# Patient Record
Sex: FEMALE | Race: OTHER | HISPANIC OR LATINO | Employment: FULL TIME | URBAN - METROPOLITAN AREA
[De-identification: names, ages, dates, MRNs, and addresses within clinical notes are randomized per-mention and may not be internally consistent; named-entity substitution may affect disease eponyms.]

---

## 2020-06-06 ENCOUNTER — APPOINTMENT (EMERGENCY)
Dept: RADIOLOGY | Facility: HOSPITAL | Age: 20
End: 2020-06-06
Payer: COMMERCIAL

## 2020-06-06 ENCOUNTER — HOSPITAL ENCOUNTER (EMERGENCY)
Facility: HOSPITAL | Age: 20
Discharge: HOME | End: 2020-06-07
Attending: EMERGENCY MEDICINE
Payer: COMMERCIAL

## 2020-06-06 VITALS
WEIGHT: 154.13 LBS | TEMPERATURE: 97.8 F | HEART RATE: 100 BPM | OXYGEN SATURATION: 98 % | RESPIRATION RATE: 22 BRPM | DIASTOLIC BLOOD PRESSURE: 90 MMHG | SYSTOLIC BLOOD PRESSURE: 151 MMHG

## 2020-06-06 DIAGNOSIS — S62.639A CLOSED FRACTURE OF TUFT OF DISTAL PHALANX OF FINGER: Primary | ICD-10-CM

## 2020-06-06 DIAGNOSIS — S50.11XA CONTUSION OF ELBOW AND FOREARM, RIGHT, INITIAL ENCOUNTER: ICD-10-CM

## 2020-06-06 DIAGNOSIS — S61.316A LACERATION OF RIGHT LITTLE FINGER WITHOUT FOREIGN BODY WITH DAMAGE TO NAIL, INITIAL ENCOUNTER: ICD-10-CM

## 2020-06-06 LAB
EXT PREG TEST URINE: NEGATIVE
EXT. CONTROL ED NAV: NORMAL

## 2020-06-06 PROCEDURE — 12001 RPR S/N/AX/GEN/TRNK 2.5CM/<: CPT | Performed by: EMERGENCY MEDICINE

## 2020-06-06 PROCEDURE — 73110 X-RAY EXAM OF WRIST: CPT

## 2020-06-06 PROCEDURE — 73090 X-RAY EXAM OF FOREARM: CPT

## 2020-06-06 PROCEDURE — 81025 URINE PREGNANCY TEST: CPT | Performed by: EMERGENCY MEDICINE

## 2020-06-06 PROCEDURE — 99283 EMERGENCY DEPT VISIT LOW MDM: CPT

## 2020-06-06 PROCEDURE — 73130 X-RAY EXAM OF HAND: CPT

## 2020-06-06 PROCEDURE — 99285 EMERGENCY DEPT VISIT HI MDM: CPT | Performed by: EMERGENCY MEDICINE

## 2020-06-06 RX ORDER — LORAZEPAM 0.5 MG/1
1 TABLET ORAL ONCE
Status: COMPLETED | OUTPATIENT
Start: 2020-06-06 | End: 2020-06-06

## 2020-06-06 RX ORDER — ACETAMINOPHEN AND CODEINE PHOSPHATE 300; 30 MG/1; MG/1
1-2 TABLET ORAL EVERY 6 HOURS PRN
Qty: 5 TABLET | Refills: 0 | Status: SHIPPED | OUTPATIENT
Start: 2020-06-06 | End: 2020-06-16

## 2020-06-06 RX ORDER — IBUPROFEN 600 MG/1
600 TABLET ORAL EVERY 6 HOURS PRN
Qty: 20 TABLET | Refills: 0 | Status: SHIPPED | OUTPATIENT
Start: 2020-06-06 | End: 2020-09-15

## 2020-06-06 RX ORDER — LIDOCAINE HYDROCHLORIDE 20 MG/ML
5 INJECTION, SOLUTION EPIDURAL; INFILTRATION; INTRACAUDAL; PERINEURAL ONCE
Status: COMPLETED | OUTPATIENT
Start: 2020-06-06 | End: 2020-06-06

## 2020-06-06 RX ORDER — NAPROXEN 500 MG/1
500 TABLET ORAL ONCE
Status: COMPLETED | OUTPATIENT
Start: 2020-06-06 | End: 2020-06-06

## 2020-06-06 RX ADMIN — LORAZEPAM 1 MG: 0.5 TABLET ORAL at 23:06

## 2020-06-06 RX ADMIN — NAPROXEN 500 MG: 500 TABLET ORAL at 23:06

## 2020-06-06 RX ADMIN — LIDOCAINE HYDROCHLORIDE 5 ML: 20 INJECTION, SOLUTION EPIDURAL; INFILTRATION; INTRACAUDAL; PERINEURAL at 23:46

## 2020-06-07 PROCEDURE — 90715 TDAP VACCINE 7 YRS/> IM: CPT | Performed by: EMERGENCY MEDICINE

## 2020-06-07 PROCEDURE — 90471 IMMUNIZATION ADMIN: CPT

## 2020-06-07 RX ORDER — GINSENG 100 MG
1 CAPSULE ORAL ONCE
Status: COMPLETED | OUTPATIENT
Start: 2020-06-07 | End: 2020-06-07

## 2020-06-07 RX ADMIN — TETANUS TOXOID, REDUCED DIPHTHERIA TOXOID AND ACELLULAR PERTUSSIS VACCINE, ADSORBED 0.5 ML: 5; 2.5; 8; 8; 2.5 SUSPENSION INTRAMUSCULAR at 00:49

## 2020-06-07 RX ADMIN — BACITRACIN ZINC 1 SMALL APPLICATION: 500 OINTMENT TOPICAL at 00:49

## 2020-07-14 ENCOUNTER — HOSPITAL ENCOUNTER (EMERGENCY)
Facility: HOSPITAL | Age: 20
Discharge: HOME | End: 2020-07-14
Attending: EMERGENCY MEDICINE | Admitting: EMERGENCY MEDICINE
Payer: COMMERCIAL

## 2020-07-14 VITALS
WEIGHT: 150.8 LBS | HEART RATE: 71 BPM | OXYGEN SATURATION: 98 % | RESPIRATION RATE: 16 BRPM | SYSTOLIC BLOOD PRESSURE: 116 MMHG | DIASTOLIC BLOOD PRESSURE: 77 MMHG | TEMPERATURE: 97.2 F

## 2020-07-14 DIAGNOSIS — H65.192 ACUTE MIDDLE EAR EFFUSION, LEFT: Primary | ICD-10-CM

## 2020-07-14 PROCEDURE — 99283 EMERGENCY DEPT VISIT LOW MDM: CPT | Performed by: PHYSICIAN ASSISTANT

## 2020-07-14 PROCEDURE — 99282 EMERGENCY DEPT VISIT SF MDM: CPT

## 2020-07-14 RX ORDER — ACETAMINOPHEN 325 MG/1
650 TABLET ORAL EVERY 6 HOURS PRN
Qty: 30 TABLET | Refills: 0 | Status: SHIPPED | OUTPATIENT
Start: 2020-07-14 | End: 2020-09-15

## 2020-07-14 RX ORDER — FEXOFENADINE HYDROCHLORIDE 60 MG/1
60 TABLET, FILM COATED ORAL 2 TIMES DAILY
Qty: 20 TABLET | Refills: 0 | Status: SHIPPED | OUTPATIENT
Start: 2020-07-14 | End: 2020-09-15

## 2020-07-14 NOTE — ED PROVIDER NOTES
History  Chief Complaint   Patient presents with    Earache     left ear pain and headache x1 week, no meds taken for symptoms     60-year-old female presenting for evaluation left-sided ear pain for approximately 1 week  She reports pain is nonradiating and giving her headache  She did not take anything for pain prior to arrival   She denies any sore throat  States she has had nasal congestion  No fevers chills sweats nausea vomiting or diarrhea  No known covered positive exposure  No cough  Prior to Admission Medications   Prescriptions Last Dose Informant Patient Reported? Taking?   ibuprofen (MOTRIN) 600 mg tablet   No No   Sig: Take 1 tablet (600 mg total) by mouth every 6 (six) hours as needed for mild pain      Facility-Administered Medications: None       Past Medical History:   Diagnosis Date    Asthma        History reviewed  No pertinent surgical history  History reviewed  No pertinent family history  I have reviewed and agree with the history as documented  E-Cigarette/Vaping     E-Cigarette/Vaping Substances     Social History     Tobacco Use    Smoking status: Never Smoker    Smokeless tobacco: Never Used   Substance Use Topics    Alcohol use: Not Currently    Drug use: Never       Review of Systems   All other systems reviewed and are negative  Physical Exam  Physical Exam   Constitutional: She is oriented to person, place, and time  She appears well-developed and well-nourished  No distress  HENT:   Head: Normocephalic and atraumatic  Right Ear: External ear normal    Left Ear: External ear normal    Ears:    No L mastoid tenderness, no tenderness of the L pinna or tragus   Eyes: Conjunctivae are normal    EOM grossly intact   Neck: Normal range of motion  Neck supple  No JVD present  Cardiovascular: Normal rate  Pulmonary/Chest: Effort normal    Abdominal: Soft     Musculoskeletal:   FROM, steady gait, cap refill brisk, strength and sensation grossly intact throughout   Neurological: She is alert and oriented to person, place, and time  Skin: Skin is warm and dry  Capillary refill takes less than 2 seconds  Psychiatric: She has a normal mood and affect  Her behavior is normal    Nursing note and vitals reviewed  Vital Signs  ED Triage Vitals [07/14/20 1618]   Temperature Pulse Respirations Blood Pressure SpO2   (!) 97 2 °F (36 2 °C) 71 16 116/77 98 %      Temp Source Heart Rate Source Patient Position - Orthostatic VS BP Location FiO2 (%)   Tympanic Monitor Sitting Left arm --      Pain Score       --           Vitals:    07/14/20 1618   BP: 116/77   Pulse: 71   Patient Position - Orthostatic VS: Sitting         Visual Acuity      ED Medications  Medications - No data to display    Diagnostic Studies  Results Reviewed     None                 No orders to display              Procedures  Procedures         ED Course           CRAFFT      Most Recent Value   During the past 12 months, did you:   1  Drink any alcohol (more than a few sips)? No Filed at: 07/14/2020 1619   2  Smoke any marijuana or hashish  No Filed at: 07/14/2020 1619   3  Use anything else to get high? ("anything else" includes illegal drugs, over the counter and prescription drugs, and things that you sniff or 'swift')? No Filed at: 07/14/2020 1619                                        MDM  Number of Diagnoses or Management Options  Diagnosis management comments: 28-year-old female presenting for evaluation of left ear pain, physical examination patient has mid ear effusion, no physical exam findings of acute otitis media or otitis externa, will provide patient with prescription for antihistamine, she is afebrile, vitals normal, f/u with pcp as an outpatient    strict return to ED precautions discussed  Pt verbalizes understanding and agrees with plan  Pt is stable for discharge    Portions of the record may have been created with voice recognition software   Occasional wrong word or "sound a like" substitutions may have occurred due to the inherent limitations of voice recognition software  Read the chart carefully and recognize, using context, where substitutions have occurred  Disposition  Final diagnoses:   Acute middle ear effusion, left     Time reflects when diagnosis was documented in both MDM as applicable and the Disposition within this note     Time User Action Codes Description Comment    7/14/2020  4:29 PM Jacob Cifuentes Radames [H65 192] Acute middle ear effusion, left       ED Disposition     ED Disposition Condition Date/Time Comment    Discharge Stable Tue Jul 14, 2020  4:29 PM Orrtanna Friendly discharge to home/self care  Follow-up Information     Follow up With Specialties Details Why Contact Info Additional [address] Call in 1 day  [address], [address] [facility] [address]  [address], [address], [address] [place], [place], [address]          Patient's Medications   Discharge Prescriptions    ACETAMINOPHEN (TYLENOL) 325 MG TABLET    Take 2 tablets (650 mg total) by mouth every 6 (six) hours as needed for mild pain Take 2 tablets by mouth every 6 hours as needed for pain       Start Date: 7/14/2020 End Date: --       Order Dose: 650 mg       Quantity: 30 tablet    Refills: 0    FEXOFENADINE (ALLEGRA) 60 MG TABLET    Take 1 tablet (60 mg total) by mouth 2 (two) times a day       Start Date: 7/14/2020 End Date: --       Order Dose: 60 mg       Quantity: 20 tablet    Refills: 0     No discharge procedures on file      PDMP Review     None          ED Provider  Electronically Signed by           Christiano Farr PA-C  07/14/20 8620

## 2020-09-10 ENCOUNTER — HOSPITAL ENCOUNTER (EMERGENCY)
Facility: HOSPITAL | Age: 20
Discharge: HOME | End: 2020-09-11
Attending: EMERGENCY MEDICINE | Admitting: EMERGENCY MEDICINE
Payer: COMMERCIAL

## 2020-09-10 DIAGNOSIS — N93.9 ABNORMAL UTERINE BLEEDING: Primary | ICD-10-CM

## 2020-09-10 DIAGNOSIS — Z71.1 CONCERN ABOUT STD IN FEMALE WITHOUT DIAGNOSIS: ICD-10-CM

## 2020-09-10 LAB
BILIRUB UR QL STRIP: NEGATIVE
CLARITY UR: CLEAR
COLOR UR: YELLOW
EXT PREG TEST URINE: NEGATIVE
EXT. CONTROL ED NAV: NORMAL
GLUCOSE UR STRIP-MCNC: NEGATIVE MG/DL
HGB UR QL STRIP.AUTO: ABNORMAL
KETONES UR STRIP-MCNC: ABNORMAL MG/DL
LEUKOCYTE ESTERASE UR QL STRIP: ABNORMAL
NITRITE UR QL STRIP: NEGATIVE
PH UR STRIP.AUTO: 6 [PH] (ref 4.5–8)
PROT UR STRIP-MCNC: NEGATIVE MG/DL
SP GR UR STRIP.AUTO: >=1.03 (ref 1–1.03)
UROBILINOGEN UR QL STRIP.AUTO: 0.2 E.U./DL

## 2020-09-10 PROCEDURE — 81025 URINE PREGNANCY TEST: CPT | Performed by: NURSE PRACTITIONER

## 2020-09-10 PROCEDURE — 81001 URINALYSIS AUTO W/SCOPE: CPT

## 2020-09-10 PROCEDURE — 99284 EMERGENCY DEPT VISIT MOD MDM: CPT

## 2020-09-10 NOTE — Clinical Note
Zoë Berger was seen and treated in our emergency department on 9/10/2020  Diagnosis:     Alcides Russell  may return to work on return date  She may return on this date: 09/11/2020         If you have any questions or concerns, please don't hesitate to call        Bry Miller RN    ______________________________           _______________          _______________  Hospital Representative                              Date                                Time

## 2020-09-11 ENCOUNTER — TELEPHONE (OUTPATIENT)
Dept: OBGYN CLINIC | Facility: CLINIC | Age: 20
End: 2020-09-11

## 2020-09-11 VITALS
SYSTOLIC BLOOD PRESSURE: 110 MMHG | RESPIRATION RATE: 16 BRPM | DIASTOLIC BLOOD PRESSURE: 70 MMHG | TEMPERATURE: 98.1 F | WEIGHT: 150.13 LBS | HEART RATE: 74 BPM | OXYGEN SATURATION: 100 %

## 2020-09-11 LAB
B-HCG SERPL-ACNC: <2 MIU/ML
BACTERIA UR QL AUTO: ABNORMAL /HPF
NON-SQ EPI CELLS URNS QL MICRO: ABNORMAL /HPF
RBC #/AREA URNS AUTO: ABNORMAL /HPF
WBC #/AREA URNS AUTO: ABNORMAL /HPF

## 2020-09-11 PROCEDURE — 99282 EMERGENCY DEPT VISIT SF MDM: CPT | Performed by: NURSE PRACTITIONER

## 2020-09-11 PROCEDURE — 87591 N.GONORRHOEAE DNA AMP PROB: CPT | Performed by: NURSE PRACTITIONER

## 2020-09-11 PROCEDURE — 84702 CHORIONIC GONADOTROPIN TEST: CPT | Performed by: NURSE PRACTITIONER

## 2020-09-11 PROCEDURE — 87491 CHLMYD TRACH DNA AMP PROBE: CPT | Performed by: NURSE PRACTITIONER

## 2020-09-11 PROCEDURE — 96372 THER/PROPH/DIAG INJ SC/IM: CPT

## 2020-09-11 PROCEDURE — 36415 COLL VENOUS BLD VENIPUNCTURE: CPT | Performed by: NURSE PRACTITIONER

## 2020-09-11 RX ORDER — ACETAMINOPHEN 325 MG/1
975 TABLET ORAL ONCE
Status: COMPLETED | OUTPATIENT
Start: 2020-09-11 | End: 2020-09-11

## 2020-09-11 RX ORDER — AZITHROMYCIN 250 MG/1
1000 TABLET, FILM COATED ORAL ONCE
Status: COMPLETED | OUTPATIENT
Start: 2020-09-11 | End: 2020-09-11

## 2020-09-11 RX ADMIN — ACETAMINOPHEN 975 MG: 325 TABLET ORAL at 00:19

## 2020-09-11 RX ADMIN — AZITHROMYCIN MONOHYDRATE 1000 MG: 250 TABLET ORAL at 00:19

## 2020-09-11 RX ADMIN — LIDOCAINE HYDROCHLORIDE 250 MG: 10 INJECTION, SOLUTION EPIDURAL; INFILTRATION; INTRACAUDAL; PERINEURAL at 00:19

## 2020-09-11 NOTE — DISCHARGE INSTRUCTIONS
You are to call the above numbers for a family doctor and women's OB/GYN for follow up  You may also call the [facility] for other concerns    NO sex until symptoms are gone and recheck  You have a urine culture pending and will be notified if it is abnormal - your partners will need to be notified and treated  You are to see gyn for IUD removal

## 2020-09-11 NOTE — ED PROVIDER NOTES
History  Chief Complaint   Patient presents with    Vaginal Bleeding     Pt reports "I think I am having a miscarriage" Pt reports did not take test but has a bloody tissue in her bag  low abd pain     This is a 23year old female who states moved here from [place] 3 months ago and her LMP was 2020, she missed July and august  She states that she has regular menses  She states tonight she passed some blood and she is concerned she had a miscarriage  She denies taking a pregnancy test at home  She states she has had 2 sexual partners since arriving in [address]  She denies knowledge of STD's from partners  Pt states she has b/l lower pelvic cramping  Denies fevers, n/v/d  She states she has very little bleeding at this time  Pt states she has an IUD that  in April       History provided by:  Medical records and patient   used: Yes    Vaginal Bleeding   Quality:  Bright red and passed tissue  Onset quality:  Sudden  Progression:  Improving  Menstrual history:  Regular  Number of pads used:  Unable to state   Possible pregnancy: yes    Associated symptoms: abdominal pain    Risk factors: multiple partners and unprotected sex        Prior to Admission Medications   Prescriptions Last Dose Informant Patient Reported? Taking?   acetaminophen (TYLENOL) 325 mg tablet   No Yes   Sig: Take 2 tablets (650 mg total) by mouth every 6 (six) hours as needed for mild pain Take 2 tablets by mouth every 6 hours as needed for pain   fexofenadine (ALLEGRA) 60 MG tablet   No Yes   Sig: Take 1 tablet (60 mg total) by mouth 2 (two) times a day   ibuprofen (MOTRIN) 600 mg tablet Not Taking at Unknown time  No No   Sig: Take 1 tablet (600 mg total) by mouth every 6 (six) hours as needed for mild pain   Patient not taking: Reported on 2020      Facility-Administered Medications: None       Past Medical History:   Diagnosis Date    Asthma     Gastritis        History reviewed   No pertinent surgical history  History reviewed  No pertinent family history  I have reviewed and agree with the history as documented  E-Cigarette/Vaping    E-Cigarette Use Never User      E-Cigarette/Vaping Substances     Social History     Tobacco Use    Smoking status: Never Smoker    Smokeless tobacco: Never Used   Substance Use Topics    Alcohol use: Not Currently    Drug use: Never       Review of Systems   Gastrointestinal: Positive for abdominal pain  Genitourinary: Positive for vaginal bleeding  All other systems reviewed and are negative  Physical Exam  Physical Exam  Vitals signs and nursing note reviewed  Constitutional:       General: She is not in acute distress  Appearance: Normal appearance  She is normal weight  She is not ill-appearing, toxic-appearing or diaphoretic  Comments: Pt observed on cell  Phone and speaking with friend at bedside  Pt friend at bedside speaking on cell phone - she was asked multiple times to refrain from speaking on phone will provider is attempting to use crycrom interpretor via I pad and it is difficult to hear  Friend becomes visibly agitated at request      HENT:      Head: Normocephalic and atraumatic  Eyes:      Extraocular Movements: Extraocular movements intact  Pupils: Pupils are equal, round, and reactive to light  Neck:      Musculoskeletal: Normal range of motion  Cardiovascular:      Rate and Rhythm: Normal rate and regular rhythm  Pulmonary:      Effort: Pulmonary effort is normal       Breath sounds: Normal breath sounds  Abdominal:      General: Bowel sounds are normal  There is no distension  Tenderness: There is no abdominal tenderness  Genitourinary:         Comments: Pt has a piece of toilet tissue in a ziplock baggie with 2 small light to dark red spots on it    approx 10 mm in diameter each     Pelvic Exam:  No pain with speculum insertion  Light pink blood noted at entrance of vagina  Cervix is cherry pink, + white/grey discharge  No blood products or tissue noted in vagina  Musculoskeletal: Normal range of motion  Skin:     General: Skin is warm and dry  Neurological:      General: No focal deficit present  Mental Status: She is alert and oriented to person, place, and time  Psychiatric:         Mood and Affect: Mood normal          Behavior: Behavior normal          Thought Content:  Thought content normal          Judgment: Judgment normal          Vital Signs  ED Triage Vitals   Temperature Pulse Respirations Blood Pressure SpO2   09/10/20 2247 09/10/20 2247 09/10/20 2247 09/10/20 2247 09/10/20 2247   98 1 °F (36 7 °C) 94 16 113/66 97 %      Temp src Heart Rate Source Patient Position - Orthostatic VS BP Location FiO2 (%)   -- 09/11/20 0043 09/11/20 0043 09/11/20 0043 --    Monitor Lying Left arm       Pain Score       09/10/20 2247       6           Vitals:    09/10/20 2247 09/11/20 0043   BP: 113/66 110/70   Pulse: 94 74   Patient Position - Orthostatic VS:  Lying         Visual Acuity      ED Medications  Medications   azithromycin (ZITHROMAX) tablet 1,000 mg (1,000 mg Oral Given 9/11/20 0019)   cefTRIAXone (ROCEPHIN) 250 mg in lidocaine (PF) (XYLOCAINE-MPF) 1 % IM only syringe (250 mg Intramuscular Given 9/11/20 0019)   acetaminophen (TYLENOL) tablet 975 mg (975 mg Oral Given 9/11/20 0019)       Diagnostic Studies  Results Reviewed     Procedure Component Value Units Date/Time    hCG, quantitative [[id number]]  (Normal) Collected:  09/11/20 0021    Lab Status:  Final result Specimen:  Blood from Arm, Right Updated:  09/11/20 0050     HCG, Quant <2 mIU/mL     Narrative:        Expected Ranges:     Approximate               Approximate HCG  Gestation age          Concentration ( mIU/mL)  _____________          ______________________   Zula Him                      HCG values  0 2-1                       5-50  1-2                           2-3                         100-5000  3-4 500-99523  4-5                         1000-02306  5-6                         [id number]  6-8                         [id number]  8-12                        [id number]      Urine Microscopic [[id number]]  (Abnormal) Collected:  09/10/20 2353    Lab Status:  Final result Specimen:  Urine, Clean Catch Updated:  09/11/20 0022     RBC, UA 2-4 /hpf      WBC, UA 2-4 /hpf      Epithelial Cells Occasional /hpf      Bacteria, UA None Seen /hpf     Chlamydia/GC amplified DNA by PCR [[id number]] Collected:  09/11/20 0011    Lab Status: In process Specimen:  Urine, Other Updated:  09/11/20 0016    POCT urinalysis dipstick [[id number]]  (Abnormal) Resulted:  09/10/20 2357    Lab Status:  Final result Specimen:  Urine Updated:  09/10/20 2357    POCT pregnancy, urine [[id number]]  (Normal) Resulted:  09/10/20 2357    Lab Status:  Final result Updated:  09/10/20 2357     EXT PREG TEST UR (Ref: Negative) Negative     Control Valid    Urine Macroscopic, POC [[id number]]  (Abnormal) Collected:  09/10/20 2353    Lab Status:  Final result Specimen:  Urine Updated:  09/10/20 2355     Color, UA Yellow     Clarity, UA Clear     pH, UA 6 0     Leukocytes, UA Trace     Nitrite, UA Negative     Protein, UA Negative mg/dl      Glucose, UA Negative mg/dl      Ketones, UA Trace mg/dl      Urobilinogen, UA 0 2 E U /dl      Bilirubin, UA Negative     Blood, UA Trace     Specific Gravity, UA >=1 030    Narrative:       CLINITEK RESULT                 No orders to display              Procedures  Procedures         ED Course  ED Course as of Sep 11 0122   Fri Sep 11, 2020   0009 PREGNANCY TEST URINE: Negative   0036 Urine not positive for UTI  Education provided in Occitan to pt regarding STD concern, culture and need of treatment for partners  Pt verbalizes understanding of all dc instructions and follow up       0059 Beta quant negative  Pt verbalizes understanding of all dc instructions and follow up     Vaginal bleeding appears to be consistent with STD concern, as her pelvic exam was consistent with this as well  CARRIE      Most Recent Value   SBIRT (13-23 yo)   In order to provide better care to our patients, we are screening all of our patients for alcohol and drug use  Would it be okay to ask you these screening questions? Yes Filed at: 2020 0004   CARRIE Initial Screen: During the past 12 months, did you:   1  Drink any alcohol (more than a few sips)? No Filed at: 2020 0004   2  Smoke any marijuana or hashish  No Filed at: 2020 0004   3  Use anything else to get high? ("anything else" includes illegal drugs, over the counter and prescription drugs, and things that you sniff or 'swift')? No Filed at: 2020 0004                                    [facility]  Number of Diagnoses or Management Options  Abnormal uterine bleeding:   Concern about STD in female without diagnosis:   Diagnosis management comments: Vaginal bleeding  Concern for miscarriage    DDX:  Spontaneous   Miscarriage in process  STD  UTI    Plan  uahcg  ua  Will do beta quant as ua hcg negative   gc chlamydia  Azithromycin  Rocephin IM  Vaginal exam         Amount and/or Complexity of Data Reviewed  Clinical lab tests: ordered and reviewed  Review and summarize past medical records: yes        Disposition  Final diagnoses:   Abnormal uterine bleeding   Concern about STD in female without diagnosis     Time reflects when diagnosis was documented in both MDM as applicable and the Disposition within this note     Time User Action Codes Description Comment    2020 12:17 AM Carmina Secaucus Add [N93 9] Abnormal uterine bleeding     2020 12:19 AM Carmina Secaucus Add [Z71 1] Concern about STD in female without diagnosis       ED Disposition     ED Disposition Condition Date/Time Comment    Discharge Stable Fri Sep 11, 2020 12:34 AM Patrecia Early discharge to home/self care              Follow-up Information     Follow up With Specialties Details Why Contact Info Additional [address] [facility] Obstetrics and Gynecology Schedule an appointment as soon as possible for a visit in 2 days  [address], [address] [address]  [place], [address], [address] [place], [place], [address] [facility] Family Medicine Schedule an appointment as soon as possible for a visit  call if you need a PCP [address], [address] [address]  [address], [address], [address] [place], [place], [address]    [facility] Internal Medicine  If symptoms worsen [address] [place] [address]  [phone]             Discharge Medication List as of 9/11/2020  1:00 AM      CONTINUE these medications which have NOT CHANGED    Details   acetaminophen (TYLENOL) 325 mg tablet Take 2 tablets (650 mg total) by mouth every 6 (six) hours as needed for mild pain Take 2 tablets by mouth every 6 hours as needed for pain, Starting Tue 7/14/2020, Print      fexofenadine (ALLEGRA) 60 MG tablet Take 1 tablet (60 mg total) by mouth 2 (two) times a day, Starting Tue 7/14/2020, Print      ibuprofen (MOTRIN) 600 mg tablet Take 1 tablet (600 mg total) by mouth every 6 (six) hours as needed for mild pain, Starting Sat 6/6/2020, Print           No discharge procedures on file      PDMP Review     None          ED Provider  Electronically Signed by           Antonio Aguilera  09/11/20 3966

## 2020-09-12 LAB
C TRACH DNA SPEC QL NAA+PROBE: POSITIVE
N GONORRHOEA DNA SPEC QL NAA+PROBE: POSITIVE

## 2020-09-15 ENCOUNTER — OFFICE VISIT (OUTPATIENT)
Dept: OBGYN CLINIC | Facility: CLINIC | Age: 20
End: 2020-09-15

## 2020-09-15 VITALS
HEIGHT: 67 IN | HEART RATE: 75 BPM | WEIGHT: 148.4 LBS | BODY MASS INDEX: 23.29 KG/M2 | SYSTOLIC BLOOD PRESSURE: 103 MMHG | DIASTOLIC BLOOD PRESSURE: 69 MMHG

## 2020-09-15 DIAGNOSIS — Z20.2 STD EXPOSURE: Primary | ICD-10-CM

## 2020-09-15 PROCEDURE — 99202 OFFICE O/P NEW SF 15 MIN: CPT | Performed by: OBSTETRICS & GYNECOLOGY

## 2020-09-15 NOTE — PROGRESS NOTES
Assessment/Plan:     No problem-specific Assessment & Plan notes found for this encounter  Diagnoses and all orders for this visit:    STD exposure    Other orders  -     levonorgestrel (QUENTIN) 13 5 MG intrauterine device; 1 Intra Uterine Device by Intrauterine route once      RTO for EMELINA  RTO for quentin removal         Subjective:      Patient ID: Mode Gomez is a 23 y o  female presents a as a follow up to the ED  She presented with pain and bleeding and was found to have +Chlamdydia  And + Gonorrhea  She was treated appropriately  HCG quant was negative  The patient has a quentin which was placed on   She knows it is  and will return to have it removed  She doesn't know what she wants for birth control after it is removed  HPI    The following portions of the patient's history were reviewed and updated as appropriate: allergies, current medications, past family history, past medical history, past social history, past surgical history and problem list     Review of Systems      Objective:      /69   Pulse 75   Ht 5' 7" (1 702 m)   Wt 67 3 kg (148 lb 6 4 oz)   BMI 23 24 kg/m²          Physical Exam  Vitals signs and nursing note reviewed  Exam conducted with a chaperone present  Constitutional:       Appearance: Normal appearance  Pulmonary:      Effort: Pulmonary effort is normal    Neurological:      General: No focal deficit present  Mental Status: She is alert and oriented to person, place, and time     Psychiatric:         Mood and Affect: Mood normal          Behavior: Behavior normal